# Patient Record
(demographics unavailable — no encounter records)

---

## 2025-03-24 NOTE — PHYSICAL EXAM
[FreeTextEntry3] : hyperkeratotic erythematous plaques throughout scalp hypopigmented macules on bl cheeks, chin

## 2025-03-24 NOTE — ASSESSMENT
[FreeTextEntry1] :  #Seborrheic Dermatitis --of scalp --chronic, flaring --start ketoconazole shampoo 3x a week to scalp; leave on for at least 2-5 min before rinsing. Counseled on drying nature of shampoo so can alternate with moisturizing shampoo/conditioner - START clobetasol 0.05% ointment BID for 1 month straight PRN flare, take 1 week break before repeated cycles (2 weeks on, 1 week off) PRN flare. Avoid face, axilla, groin.  SED including atrophy, dyspigmentation, telangiectasias, striae. Proper use reviewed including only using to affected area and avoidance of prolonged use. --start ketoconazole cream BID to face  RTC April

## 2025-03-24 NOTE — HISTORY OF PRESENT ILLNESS
[de-identified] : 14 yr old female presents with mother for itchy scalp. Has been using head and shoulders.